# Patient Record
Sex: FEMALE | Race: WHITE | Employment: OTHER | URBAN - METROPOLITAN AREA
[De-identification: names, ages, dates, MRNs, and addresses within clinical notes are randomized per-mention and may not be internally consistent; named-entity substitution may affect disease eponyms.]

---

## 2017-01-27 ENCOUNTER — HOSPITAL ENCOUNTER (OUTPATIENT)
Dept: MRI IMAGING | Age: 82
Discharge: HOME | End: 2017-01-27
Attending: INTERNAL MEDICINE
Payer: MEDICARE

## 2017-01-27 DIAGNOSIS — K86.89 MASS OF PANCREAS: ICD-10-CM

## 2017-01-27 PROCEDURE — 74181 MRI ABDOMEN W/O CONTRAST: CPT

## 2017-03-24 ENCOUNTER — HOSPITAL ENCOUNTER (OUTPATIENT)
Dept: INFUSION THERAPY | Age: 82
Discharge: HOME | End: 2017-03-24
Payer: MEDICARE

## 2017-03-24 VITALS
TEMPERATURE: 97.6 F | OXYGEN SATURATION: 99 % | SYSTOLIC BLOOD PRESSURE: 149 MMHG | DIASTOLIC BLOOD PRESSURE: 78 MMHG | RESPIRATION RATE: 16 BRPM | HEART RATE: 57 BPM

## 2017-03-24 PROCEDURE — 74011250636 HC RX REV CODE- 250/636: Performed by: INTERNAL MEDICINE

## 2017-03-24 PROCEDURE — 74011000258 HC RX REV CODE- 258: Performed by: INTERNAL MEDICINE

## 2017-03-24 PROCEDURE — 96374 THER/PROPH/DIAG INJ IV PUSH: CPT

## 2017-03-24 RX ORDER — SODIUM CHLORIDE 0.9 % (FLUSH) 0.9 %
5-10 SYRINGE (ML) INJECTION AS NEEDED
Status: ACTIVE | OUTPATIENT
Start: 2017-03-24 | End: 2017-03-25

## 2017-03-24 RX ORDER — METFORMIN HYDROCHLORIDE 500 MG/1
500 TABLET ORAL DAILY
COMMUNITY

## 2017-03-24 RX ORDER — SODIUM CHLORIDE 9 MG/ML
25 INJECTION, SOLUTION INTRAVENOUS AS NEEDED
Status: DISPENSED | OUTPATIENT
Start: 2017-03-24 | End: 2017-03-25

## 2017-03-24 RX ADMIN — FERUMOXYTOL 510 MG: 510 INJECTION INTRAVENOUS at 12:24

## 2017-03-24 NOTE — PROGRESS NOTES
Outpatient Infusion Center Visit Progress Note    1120 Pt admit to [facility] for Feraheme 1/2 ambulatory in stable condition. Assessment completed. PIV placed in right arm with no issues. No new concerns voiced. Visit Vitals    /78 (BP 1 Location: Right arm)    Pulse (!) 57    Temp 97.6 °F (36.4 °C)    Resp 16    SpO2 99%    Breastfeeding No       Medications:  Feraheme    1305 Pt tolerated treatment well. Monitored for 30 minutes post infusion. D/c home ambulatory in no distress. Pt aware of next appointment scheduled for 3/28/17.

## 2017-03-28 ENCOUNTER — OFFICE VISIT (OUTPATIENT)
Dept: CARDIOLOGY CLINIC | Age: 82
End: 2017-03-28

## 2017-03-28 VITALS
SYSTOLIC BLOOD PRESSURE: 122 MMHG | BODY MASS INDEX: 17.79 KG/M2 | OXYGEN SATURATION: 99 % | WEIGHT: 90.6 LBS | RESPIRATION RATE: 16 BRPM | DIASTOLIC BLOOD PRESSURE: 64 MMHG | HEIGHT: 60 IN | HEART RATE: 61 BPM

## 2017-03-28 DIAGNOSIS — I34.0 NON-RHEUMATIC MITRAL REGURGITATION: ICD-10-CM

## 2017-03-28 DIAGNOSIS — I36.1 NON-RHEUMATIC TRICUSPID VALVE INSUFFICIENCY: ICD-10-CM

## 2017-03-28 DIAGNOSIS — I27.20 PULMONARY HYPERTENSION (HCC): ICD-10-CM

## 2017-03-28 DIAGNOSIS — N28.9 RENAL INSUFFICIENCY: ICD-10-CM

## 2017-03-28 DIAGNOSIS — J43.9 PULMONARY EMPHYSEMA, UNSPECIFIED EMPHYSEMA TYPE (HCC): ICD-10-CM

## 2017-03-28 DIAGNOSIS — I42.9 CARDIOMYOPATHY (HCC): Primary | ICD-10-CM

## 2017-03-28 DIAGNOSIS — I50.23 ACUTE ON CHRONIC SYSTOLIC HEART FAILURE (HCC): ICD-10-CM

## 2017-03-28 DIAGNOSIS — K86.89 PANCREATIC MASS: ICD-10-CM

## 2017-03-28 DIAGNOSIS — I10 ESSENTIAL HYPERTENSION WITH GOAL BLOOD PRESSURE LESS THAN 140/90: ICD-10-CM

## 2017-03-28 DIAGNOSIS — N18.4 CKD (CHRONIC KIDNEY DISEASE) STAGE 4, GFR 15-29 ML/MIN (HCC): ICD-10-CM

## 2017-03-28 NOTE — PROGRESS NOTES
HISTORY OF PRESENT ILLNESS  Trevor Singh is a [de-identified] y.o. female. She has emphysema and chronic kidney disease. She was in the hospital last year with pleural effusions and was found to have reduced left ventricular function, which subsequently improved with therapy. 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 Her weight is down to 90 pounds. She has been seen by a surgeon who has considered exploratory surgery, but apparently, she is felt to be too high risk. She uses oxygen at night and has some dyspnea with exertion. HPI  Patient Active Problem List   Diagnosis Code    Respiratory failure ([facility] J96.90    Anemia D64.9    Acute on chronic systolic heart failure (HCC) I50.23     Current Outpatient Prescriptions   Medication Sig Dispense Refill    metFORMIN (GLUCOPHAGE) 500 mg tablet Take 500 mg by mouth daily.  amLODIPine (NORVASC) 5 mg tablet Take 1 Tab by mouth daily. 90 Tab 3    carvedilol (COREG) 3.125 mg tablet Take 1 Tab by mouth two (2) times daily (with meals). 60 Tab 2    hydrALAZINE (APRESOLINE) 25 mg tablet Take 1 Tab by mouth three (3) times daily. (Patient taking differently: Take 25 mg by mouth daily.) 90 Tab 2    albuterol-ipratropium (DUO-NEB) 2.5 mg-0.5 mg/3 ml nebu 3 mL by Nebulization route every four (4) hours as needed. 30 mL 0    fluticasone-salmeterol (ADVAIR DISKUS) 250-50 mcg/dose diskus inhaler Take 1 Puff by inhalation every twelve (12) hours. Indications: BRONCHOSPASM PREVENTION WITH COPD (Patient taking differently: Take 1 Puff by inhalation every twelve (12) hours as needed. Indications: BRONCHOSPASM PREVENTION WITH COPD) 1 Inhaler 2    bumetanide (BUMEX) 1 mg tablet Take 1 Tab by mouth daily. 30 Tab 2    amylase-lipase-protease (CREON 6000) 6,000-19,000 -30,000 unit capsule Take 1 Cap by mouth three (3) times daily (with meals).  simvastatin (ZOCOR) 10 mg tablet Take 10 mg by mouth nightly.       aspirin delayed-release 81 mg tablet Take 81 mg by mouth daily.  fluticasone (FLONASE) 50 mcg/actuation nasal spray 2 Sprays by Both Nostrils route as needed for Rhinitis.  ferrous sulfate (IRON) 325 mg (65 mg iron) tablet Take  by mouth Daily (before breakfast). Past Medical History:   Diagnosis Date    Cancer [facility])     breast    CHF (congestive heart failure) (HCC)     Chronic renal failure, stage 4 (severe) (HCC)     Diabetes ([facility]     Gall stones     Hypertension     Pancreatic mass     Stroke ([facility]     Valvular heart disease      Past Surgical History:   Procedure Laterality Date    HX BLADDER SUSPENSION      HX BREAST LUMPECTOMY      HX HYSTERECTOMY      HX TONSIL AND ADENOIDECTOMY         Review of Systems   Constitutional: Positive for weight loss. Respiratory: Positive for shortness of breath. All other systems reviewed and are negative. Visit Vitals    /64 (BP 1 Location: Right arm, BP Patient Position: Sitting)    Pulse 61    Resp 16    Ht 5' (1.524 m)    Wt 90 lb 9.6 oz (41.1 kg)    SpO2 99%  Comment: Room Air  now    BMI 17.69 kg/m2       Physical Exam   Constitutional: She is oriented to person, place, and time. She appears well-nourished. HENT:   Head: Atraumatic. Eyes: Conjunctivae are normal.   Neck: Neck supple. Cardiovascular: Normal rate, regular rhythm and normal heart sounds. Exam reveals no gallop and no friction rub. No murmur heard. Pulmonary/Chest: Breath sounds normal. She has no wheezes. Abdominal: Bowel sounds are normal.   Musculoskeletal: She exhibits no edema. Neurological: She is oriented to person, place, and time. Skin: Skin is dry. Nursing note and vitals reviewed. ASSESSMENT and PLAN  At this point, it seems that she is not being considered for surgery, at least not for the next three months. The surgery would be extensive and she would need stress testing beforehand.   Given her emphysema, chronic kidney disease and low weight, I am not sure she could survive it. I will plan to see her back in six months.

## 2017-03-28 NOTE — MR AVS SNAPSHOT
Visit Information Date & Time Provider Department Dept. Phone Encounter #  
 3/28/2017  9:40 AM Mary Anne Boone MD [facility] [place] [phone] [id number] Your Appointments 4/25/2017 11:20 AM  
ESTABLISHED PATIENT with Mary Anne Boone MD  
[facility] ([facility] SCHEDULING) Appt Note: 6 mo f/u  
 [address] [place] [address]  
[address] [id number] Upcoming Health Maintenance Date Due DTaP/Tdap/Td series (1 - Tdap) 8/4/1957 ZOSTER VACCINE AGE 60> 8/4/1996 GLAUCOMA SCREENING Q2Y 8/4/2001 OSTEOPOROSIS SCREENING (DEXA) 8/4/2001 Pneumococcal 65+ High/Highest Risk (1 of 2 - PCV13) 8/4/2001 MEDICARE YEARLY EXAM 8/4/2001 INFLUENZA AGE 9 TO ADULT 8/1/2016 Allergies as of 3/28/2017  Review Complete On: 3/28/2017 By: Jerilyn Oakley LPN Severity Noted Reaction Type Reactions Ciprofloxacin  03/20/2014    Other (comments) Pt said abdominal pain Codeine  03/20/2014    Nausea and Vomiting Pcn [Penicillins]  03/20/2014    Hives Current Immunizations  Reviewed on 3/24/2017 No immunizations on file. Not reviewed this visit Vitals BP Pulse Resp Height(growth percentile) Weight(growth percentile) SpO2  
 122/64 (BP 1 Location: Right arm, BP Patient Position: Sitting) 61 16 5' (1.524 m) 90 lb 9.6 oz (41.1 kg) 99% BMI Smoking Status 17.69 kg/m2 Current Every Day Smoker Vitals History BMI and BSA Data Body Mass Index Body Surface Area  
 17.69 kg/m 2 1.32 m 2 Preferred Pharm[facility] Phone [id number] [address] [place] [address] [phone] Your Updated Medication List  
  
   
This list is accurate as of: 3/28/17 10:08 AM.  Always use your most recent med list.  
  
  
  
  
 albuterol-ipratropium 2.5 mg-0.5 mg/3 ml Nebu Commonly known as:  DUO-NEB  
3 mL by Nebulization route every four (4) hours as needed. amLODIPine 5 mg tablet Commonly known as:  Izetta Harder Take 1 Tab by mouth daily. amylase-lipase-protease 6,000-19,000 -30,000 unit capsule Commonly known as:  CREON 6000 Take 1 Cap by mouth three (3) times daily (with meals). aspirin delayed-release 81 mg tablet Take 81 mg by mouth daily. bumetanide 1 mg tablet Commonly known as:  Keaton Shelbi Take 1 Tab by mouth daily. carvedilol 3.125 mg tablet Commonly known as:  Layman Elmore Take 1 Tab by mouth two (2) times daily (with meals). fluticasone 50 mcg/actuation nasal spray Commonly known as:  Elihue Caller 2 Sprays by Both Nostrils route as needed for Rhinitis. fluticasone-salmeterol 250-50 mcg/dose diskus inhaler Commonly known as:  ADVAIR DISKUS Take 1 Puff by inhalation every twelve (12) hours. Indications: BRONCHOSPASM PREVENTION WITH COPD  
  
 hydrALAZINE 25 mg tablet Commonly known as:  APRESOLINE Take 1 Tab by mouth three (3) times daily. Iron 325 mg (65 mg iron) tablet Generic drug:  ferrous sulfate Take  by mouth Daily (before breakfast). metFORMIN 500 mg tablet Commonly known as:  GLUCOPHAGE Take 500 mg by mouth daily. simvastatin 10 mg tablet Commonly known as:  ZOCOR Take 10 mg by mouth nightly. To-Do List   
 03/31/2017 11:00 AM  
  Appointment with [address] [place] [address] ([phone]) Introducing [facility]! Dear Katey Thrasher: 
Thank you for requesting a FileTrek account. Our records indicate that you already have an active FileTrek account. You can access your account anytime at [email or web address] Did you know that you can access your hospital and ER discharge instructions at any time in FileTrek? You can also review all of your test results from your hospital stay or ER visit. Additional Information If you have questions, please visit the Frequently Asked Questions section of the [employer] website at [email or web address] Remember, Respirics is NOT to be used for urgent needs. For medical emergencies, dial 911. Now available from your iPhone and Android! Please provide this summary of care documentation to your next provider. Your primary care clinician is listed as Wilber Pineda. If you have any questions after today's visit, please call [phone].

## 2017-03-31 ENCOUNTER — HOSPITAL ENCOUNTER (OUTPATIENT)
Dept: INFUSION THERAPY | Age: 82
Discharge: HOME | End: 2017-03-31
Payer: MEDICARE

## 2017-03-31 VITALS
DIASTOLIC BLOOD PRESSURE: 88 MMHG | RESPIRATION RATE: 16 BRPM | TEMPERATURE: 97 F | HEART RATE: 66 BPM | SYSTOLIC BLOOD PRESSURE: 186 MMHG

## 2017-03-31 PROCEDURE — 74011000258 HC RX REV CODE- 258: Performed by: INTERNAL MEDICINE

## 2017-03-31 PROCEDURE — 96374 THER/PROPH/DIAG INJ IV PUSH: CPT

## 2017-03-31 PROCEDURE — 74011250636 HC RX REV CODE- 250/636: Performed by: INTERNAL MEDICINE

## 2017-03-31 RX ORDER — SODIUM CHLORIDE 0.9 % (FLUSH) 0.9 %
5-10 SYRINGE (ML) INJECTION AS NEEDED
Status: ACTIVE | OUTPATIENT
Start: 2017-03-31 | End: 2017-04-01

## 2017-03-31 RX ORDER — SODIUM CHLORIDE 9 MG/ML
25 INJECTION, SOLUTION INTRAVENOUS AS NEEDED
Status: DISPENSED | OUTPATIENT
Start: 2017-03-31 | End: 2017-04-01

## 2017-03-31 RX ADMIN — FERUMOXYTOL 510 MG: 510 INJECTION INTRAVENOUS at 14:57

## 2017-03-31 NOTE — PROGRESS NOTES
Outpatient Infusion Center - Chemotherapy Progress Note    5134 Pt admit to [facility] for Feraheme 2/2 ambulatory in stable condition. Assessment completed. No new concerns voiced. PIV access established in R arm with positive blood return, line flushed, NS infusing. Visit Vitals    /88 (BP 1 Location: Right leg)    Pulse 66    Temp 97 °F (36.1 °C)    Resp 16       Medications:  NS  Feraheme    1545 Pt tolerated treatment well. PIV removed at discharge. D/c home ambulatory in no distress.  Pt follow up with MD.

## 2017-07-24 ENCOUNTER — HOSPITAL ENCOUNTER (OUTPATIENT)
Dept: MRI IMAGING | Age: 82
Discharge: HOME | End: 2017-07-24

## 2017-07-24 DIAGNOSIS — K86.89 MASS OF PANCREAS: ICD-10-CM

## 2017-07-24 PROCEDURE — 74181 MRI ABDOMEN W/O CONTRAST: CPT

## 2017-09-26 ENCOUNTER — OFFICE VISIT (OUTPATIENT)
Dept: CARDIOLOGY CLINIC | Age: 82
End: 2017-09-26

## 2017-09-26 VITALS
HEART RATE: 62 BPM | WEIGHT: 85.6 LBS | BODY MASS INDEX: 16.72 KG/M2 | SYSTOLIC BLOOD PRESSURE: 116 MMHG | DIASTOLIC BLOOD PRESSURE: 58 MMHG

## 2017-09-26 DIAGNOSIS — I42.9 CARDIOMYOPATHY, UNSPECIFIED TYPE (HCC): Primary | ICD-10-CM

## 2017-09-26 DIAGNOSIS — I27.20 PULMONARY HYPERTENSION (HCC): ICD-10-CM

## 2017-09-26 DIAGNOSIS — I10 ESSENTIAL HYPERTENSION WITH GOAL BLOOD PRESSURE LESS THAN 140/90: ICD-10-CM

## 2017-09-26 DIAGNOSIS — I50.23 ACUTE ON CHRONIC SYSTOLIC HEART FAILURE (HCC): ICD-10-CM

## 2017-09-26 DIAGNOSIS — I36.1 NON-RHEUMATIC TRICUSPID VALVE INSUFFICIENCY: ICD-10-CM

## 2017-09-26 DIAGNOSIS — K86.89 PANCREATIC MASS: ICD-10-CM

## 2017-09-26 DIAGNOSIS — J43.9 PULMONARY EMPHYSEMA, UNSPECIFIED EMPHYSEMA TYPE (HCC): ICD-10-CM

## 2017-09-26 DIAGNOSIS — N18.4 CKD (CHRONIC KIDNEY DISEASE) STAGE 4, GFR 15-29 ML/MIN (HCC): ICD-10-CM

## 2017-09-26 DIAGNOSIS — I34.0 NON-RHEUMATIC MITRAL REGURGITATION: ICD-10-CM

## 2017-09-26 NOTE — PROGRESS NOTES
HISTORY OF PRESENT ILLNESS  Randi Keyes is a 81 y.o. female. She has a cystic pancreatic mass that is not clearly malignant. She is being followed by <shaheen gradne> and Dr. Connie Ortega with abdominal scans every six months. She has poor appetite and has lost another six pounds, but otherwise, is doing well. She also has COPD and a history of cardiomyopathy with improved left ventricular function by her last echo done in October of 2016. At that time, her ejection fraction was felt to be 45%. She also has stage IV kidney disease and borderline diabetes. She is able to lie flat at night better than she was in the past.  She wears oxygen at night still. She no longer smokes. HPI  Patient Active Problem List   Diagnosis Code    [facility] J96.90    Anemia D64.9    Acute on chronic systolic heart failure (HCC) I50.23     Current Outpatient Prescriptions   Medication Sig Dispense Refill    metFORMIN (GLUCOPHAGE) 500 mg tablet Take 500 mg by mouth daily.  amLODIPine (NORVASC) 5 mg tablet Take 1 Tab by mouth daily. 90 Tab 3    carvedilol (COREG) 3.125 mg tablet Take 1 Tab by mouth two (2) times daily (with meals). 60 Tab 2    hydrALAZINE (APRESOLINE) 25 mg tablet Take 1 Tab by mouth three (3) times daily. (Patient taking differently: Take 25 mg by mouth daily.) 90 Tab 2    fluticasone-salmeterol (ADVAIR DISKUS) 250-50 mcg/dose diskus inhaler Take 1 Puff by inhalation every twelve (12) hours. Indications: BRONCHOSPASM PREVENTION WITH COPD (Patient taking differently: Take 1 Puff by inhalation every twelve (12) hours as needed. Indications: BRONCHOSPASM PREVENTION WITH COPD) 1 Inhaler 2    bumetanide (BUMEX) 1 mg tablet Take 1 Tab by mouth daily. 30 Tab 2    amylase-lipase-protease (CREON 6000) 6,000-19,000 -30,000 unit capsule Take 1 Cap by mouth three (3) times daily (with meals).  simvastatin (ZOCOR) 10 mg tablet Take 10 mg by mouth nightly.       aspirin delayed-release 81 mg tablet Take 81 mg by mouth daily.  ferrous sulfate (IRON) 325 mg (65 mg iron) tablet Take  by mouth Daily (before breakfast).  albuterol-ipratropium (DUO-NEB) 2.5 mg-0.5 mg/3 ml nebu 3 mL by Nebulization route every four (4) hours as needed. 30 mL 0    fluticasone (FLONASE) 50 mcg/actuation nasal spray 2 Sprays by Both Nostrils route as needed for Rhinitis. Past Medical History:   Diagnosis Date    Cancer [facility])     breast    [facility]) ([facility])     Chronic renal failure, stage 4 (severe) ([facility])     Diabetes ([facility]     Gall stones     Hypertension     Pancreatic mass     Stroke ([facility]     Valvular heart disease      Past Surgical History:   Procedure Laterality Date    HX BLADDER SUSPENSION      HX BREAST LUMPECTOMY      HX HYSTERECTOMY      HX TONSIL AND ADENOIDECTOMY         Review of Systems   Constitutional: Positive for weight loss. Respiratory: Positive for shortness of breath. All other systems reviewed and are negative. Visit Vitals    /58 (BP 1 Location: Right arm, BP Patient Position: Sitting)    Pulse 62    Wt 85 lb 9.6 oz (38.8 kg)    BMI 16.72 kg/m2       Physical Exam   Constitutional: She is oriented to person, place, and time. She appears well-nourished. HENT:   Head: Atraumatic. Eyes: Conjunctivae are normal.   Neck: Neck supple. Cardiovascular: Normal rate, regular rhythm and normal heart sounds. Exam reveals no gallop and no friction rub. No murmur heard. Pulmonary/Chest: She has no wheezes. She has no rales. Abdominal: Bowel sounds are normal.   Musculoskeletal: She exhibits no edema. Neurological: She is oriented to person, place, and time. No cranial nerve deficit. Skin: Skin is dry. Nursing note and vitals reviewed. ASSESSMENT and PLAN  Clinically, she is stable. If she continues to lose weight then some of her antihypertensive medications may have to be discontinued.   For now, I will continue her regimen and see her in six months. She certainly would need stress testing or at least a repeat echocardiogram prior to any anticipated surgery should that be planned.

## 2017-09-26 NOTE — MR AVS SNAPSHOT
Visit Information Date & Time Provider Department Dept. Phone Encounter #  
 9/26/2017 11:20 AM Sylvie Clay MD [facility] [place] [place] [phone] [id number] Your Appointments 9/26/2017 11:20 AM  
ESTABLISHED PATIENT with Sylvie Clay MD  
[facility] ([facility] SCHEDULING) Appt Note: 6 MO F/U  
 [address]  [address]  
[address] [address] [phone] Upcoming Health Maintenance Date Due DTaP/Tdap/Td series (1 - Tdap) 8/4/1957 ZOSTER VACCINE AGE 60> 6/4/1996 GLAUCOMA SCREENING Q2Y 8/4/2001 OSTEOPOROSIS SCREENING (DEXA) 8/4/2001 Pneumococcal 65+ Low/Medium Risk (1 of 2 - PCV13) 8/4/2001 MEDICARE YEARLY EXAM 8/4/2001 INFLUENZA AGE 9 TO ADULT 8/1/2017 Allergies as of 9/26/2017  Review Complete On: 9/26/2017 By: Ashlyn Phelps Severity Noted Reaction Type Reactions Ciprofloxacin  03/20/2014    Other (comments) Pt said abdominal pain Codeine  03/20/2014    Nausea and Vomiting Pcn [Penicillins]  03/20/2014    Hives Current Immunizations  Reviewed on 3/31/2017 No immunizations on file. Not reviewed this visit You Were Diagnosed With   
  
 Codes Comments Acute on chronic systolic heart failure (HCC)    -  Primary ICD-10-CM: S41.84 ICD-9-CM: 428.23 Vitals BP Pulse Weight(growth percentile) BMI Smoking Status 116/58 (BP 1 Location: Right arm, BP Patient Position: Sitting) 62 85 lb 9.6 oz (38.8 kg) 16.72 kg/m2 Current Every Day Smoker Vitals History BMI and BSA Data Body Mass Index Body Surface Area  
 16.72 kg/m 2 1.28 m 2 Preferred Pharmacy [facility] Name Phone [id number] [address] [address] [phone] Your Updated Medication List  
  
   
This list is accurate as of: 9/26/17 11:17 AM.  Always use your most recent med list.  
  
  
  
  
 albuterol-ipratropium 2.5 mg-0.5 mg/3 ml Nebu Commonly known as:  DUO-NEB  
3 mL by Nebulization route every four (4) hours as needed. amLODIPine 5 mg tablet Commonly known as:  Tiffany Chimes Take 1 Tab by mouth daily. amylase-lipase-protease 6,000-19,000 -30,000 unit capsule Commonly known as:  CREON 6000 Take 1 Cap by mouth three (3) times daily (with meals). aspirin delayed-release 81 mg tablet Take 81 mg by mouth daily. bumetanide 1 mg tablet Commonly known as:  Hokah Hora Take 1 Tab by mouth daily. carvedilol 3.125 mg tablet Commonly known as:  Gladys Awe Take 1 Tab by mouth two (2) times daily (with meals). fluticasone 50 mcg/actuation nasal spray Commonly known as:  [place] [facility] 2 Sprays by Both Nostrils route as needed for Rhinitis. fluticasone-salmeterol 250-50 mcg/dose diskus inhaler Commonly known as:  ADVAIR DISKUS Take 1 Puff by inhalation every twelve (12) hours. Indications: BRONCHOSPASM PREVENTION WITH COPD  
  
 hydrALAZINE 25 mg tablet Commonly known as:  APRESOLINE Take 1 Tab by mouth three (3) times daily. Iron 325 mg (65 mg iron) tablet Generic drug:  ferrous sulfate Take  by mouth Daily (before breakfast). metFORMIN 500 mg tablet Commonly known as:  GLUCOPHAGE Take 500 mg by mouth daily. simvastatin 10 mg tablet Commonly known as:  ZOCOR Take 10 mg by mouth nightly. We Performed the Following AMB POC EKG ROUTINE W/ 12 LEADS, INTER & REP [27447 CPT(R)] Introducing [facility]! Dear Asmita Marrero: 
Thank you for requesting a BidKind account. Our records indicate that you already have an active BidKind account. You can access your account anytime at [email or web address] Did you know that you can access your hospital and ER discharge instructions at any time in BidKind?   You can also review all of your test results from your hospital stay or ER visit. Additional Information If you have questions, please visit the Frequently Asked Questions section of the [employer] website at [email or web address] Remember, [employer] is NOT to be used for urgent needs. For medical emergencies, dial 911. Now available from your iPhone and Android! Please provide this summary of care documentation to your next provider. Your primary care clinician is listed as Dolores Barnhart. If you have any questions after today's visit, please call [phone].

## 2017-10-10 DIAGNOSIS — I50.23 ACUTE ON CHRONIC SYSTOLIC HEART FAILURE (HCC): Primary | ICD-10-CM

## 2017-10-10 RX ORDER — AMLODIPINE BESYLATE 5 MG/1
5 TABLET ORAL DAILY
Qty: 90 TAB | Refills: 3 | Status: SHIPPED | OUTPATIENT
Start: 2017-10-10

## 2017-10-10 NOTE — TELEPHONE ENCOUNTER
Requested Prescriptions     Signed Prescriptions Disp Refills    amLODIPine (NORVASC) 5 mg tablet 90 Tab 3     Sig: Take 1 Tab by mouth daily.      Authorizing Provider: Sophia Spencer     Ordering User: Enriqueta Juarez    Per Dr. Yaneth Fink verbal order